# Patient Record
(demographics unavailable — no encounter records)

---

## 2024-12-26 NOTE — PHYSICAL EXAM
[Chaperone Present] : A chaperone was present in the examining room during all aspects of the physical examination [01714] : A chaperone was present during the pelvic exam. [Abnormal] : General appearance: Abnormal [Absent] : Adnexa(ae): Absent [Normal] : Anus and perineum: Normal sphincter tone, no masses, no prolapse. [Fully active, able to carry on all pre-disease performance without restriction] : Status 0 - Fully active, able to carry on all pre-disease performance without restriction [FreeTextEntry2] : Joie Boyd [FreeTextEntry1] : Multiple skin excisions

## 2024-12-26 NOTE — HISTORY OF PRESENT ILLNESS
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 She also underwent vaginal hysterectomy for fibroids in 2003 and multiple simple partial vulvectomies including one in November 2011 with repair of an umbilical hernia. Additionally, she received numerous treatments for cutaneous squamous cell carcinoma with Xeloda. 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 Ms. 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 She then shifted to IV Carboplatinum therapy in June 2024 to reduce her current skin lesions and prevent new cancers. 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 She also notes new lesions appearing despite systemic chemo. Her treatment has been interrupted multiple times for surgeries and due to chemo side effects like BM suppression.

## 2024-12-26 NOTE — ASSESSMENT
[FreeTextEntry1] : Following the detailed appointment, the assessment is that Ms. Kruse continues to maurer multiple squamous cell carcinomas despite ongoing treatment with biweekly Carboplatinum infusions, with new squamous cell carcinomas of the skin still appearing. The GYN exam did not reveal any suspicious vulvar or vaginal lesions and her cervix appears normal. A Pap smear was taken for further investigation. Plan moving forward would include continuing current treatment, monitoring of the squamous cell carcinomas, and following up on the results of the pap smear. The patient is advised to come back to the clinic after six months or immediately if new symptoms occur. The issue of recurrent lesions needs to be addressed with the Medical Oncologist for a possible change in treatment approach or plan, and the patient needs a thorough discussion on the benefits and downsides of the treatment strategy. The possibility of enrolling in a clinical trial should also be examined, though the patient's status as a transplant recipient may limit the options for this.

## 2025-07-30 NOTE — HISTORY OF PRESENT ILLNESS
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 She also underwent vaginal hysterectomy for fibroids in 2003 and multiple simple partial vulvectomies including one in November 2011 with repair of an umbilical hernia. Additionally, she received numerous treatments for cutaneous squamous cell carcinoma with Xeloda. 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 Ms. 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 She then shifted to IV Carboplatinum therapy in June 2024 to reduce her current skin lesions and prevent new cancers. 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 She also notes new lesions appearing despite systemic chemo. Her treatment has been interrupted multiple times for surgeries and due to chemo side effects like BM suppression.   Most recent exam in 12/2024 was AAKASH, and pap smear was negative.

## 2025-07-30 NOTE — PHYSICAL EXAM
[Abnormal] : General appearance: Abnormal [Absent] : Adnexa(ae): Absent [Normal] : Anus and perineum: Normal sphincter tone, no masses, no prolapse. [Fully active, able to carry on all pre-disease performance without restriction] : Status 0 - Fully active, able to carry on all pre-disease performance without restriction [FreeTextEntry2] : Joie Boyd [FreeTextEntry1] : Multiple skin excisions